# Patient Record
Sex: FEMALE | Race: WHITE
[De-identification: names, ages, dates, MRNs, and addresses within clinical notes are randomized per-mention and may not be internally consistent; named-entity substitution may affect disease eponyms.]

---

## 2017-09-18 ENCOUNTER — HOSPITAL ENCOUNTER (OUTPATIENT)
Dept: HOSPITAL 47 - LABPAT | Age: 22
Discharge: HOME | End: 2017-09-18
Payer: COMMERCIAL

## 2017-09-18 DIAGNOSIS — M23.91: ICD-10-CM

## 2017-09-18 DIAGNOSIS — Z01.812: Primary | ICD-10-CM

## 2017-09-18 LAB
ANION GAP SERPL CALC-SCNC: 11 MMOL/L
BASOPHILS # BLD AUTO: 0.1 K/UL (ref 0–0.2)
BASOPHILS NFR BLD AUTO: 1 %
CH: 30.5
CHCM: 33.7
CHLORIDE SERPL-SCNC: 105 MMOL/L (ref 98–107)
CO2 SERPL-SCNC: 24 MMOL/L (ref 22–30)
EOSINOPHIL # BLD AUTO: 0.2 K/UL (ref 0–0.7)
EOSINOPHIL NFR BLD AUTO: 2 %
ERYTHROCYTE [DISTWIDTH] IN BLOOD BY AUTOMATED COUNT: 5.02 M/UL (ref 3.8–5.4)
ERYTHROCYTE [DISTWIDTH] IN BLOOD: 13.4 % (ref 11.5–15.5)
HCT VFR BLD AUTO: 45.6 % (ref 34–46)
HDW: 2.06
HGB BLD-MCNC: 14.7 GM/DL (ref 11.4–16)
LUC NFR BLD AUTO: 1 %
LYMPHOCYTES # SPEC AUTO: 2 K/UL (ref 1–4.8)
LYMPHOCYTES NFR SPEC AUTO: 21 %
MCH RBC QN AUTO: 29.2 PG (ref 25–35)
MCHC RBC AUTO-ENTMCNC: 32.2 G/DL (ref 31–37)
MCV RBC AUTO: 90.8 FL (ref 80–100)
MONOCYTES # BLD AUTO: 0.5 K/UL (ref 0–1)
MONOCYTES NFR BLD AUTO: 6 %
NEUTROPHILS # BLD AUTO: 6.5 K/UL (ref 1.3–7.7)
NEUTROPHILS NFR BLD AUTO: 69 %
POTASSIUM SERPL-SCNC: 5 MMOL/L (ref 3.5–5.1)
SODIUM SERPL-SCNC: 140 MMOL/L (ref 137–145)
WBC # BLD AUTO: 0.13 10*3/UL
WBC # BLD AUTO: 9.3 K/UL (ref 3.8–10.6)
WBC (PEROX): 9.06

## 2017-09-18 PROCEDURE — 85025 COMPLETE CBC W/AUTO DIFF WBC: CPT

## 2017-09-18 PROCEDURE — 80051 ELECTROLYTE PANEL: CPT

## 2017-09-21 NOTE — HP
HISTORY AND PHYSICAL



CHIEF COMPLAINT:

Right knee pain.



HISTORY OF PRESENT ILLNESS:

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

pain after an injury on 08/18/2017.  She twisted her knee playing Walleyball.  She

notes giving way when she walks.  She has tried therapy in addition to medications, an

injection, and bracing.  She denies previous problems.



PAST MEDICAL HISTORY:

Negative.



CURRENT MEDICATIONS:

None.



ALLERGIES:

She has ALLERGIES TO AMOXICILLIN.



FAMILY HISTORY:

Significant for cancer.



SOCIAL HISTORY:

Negative for current tobacco or alcohol use.



REVIEW OF SYSTEMS:

Sixteen point review of systems otherwise reviewed and is noncontributory.



PHYSICAL EXAMINATION:

On examination, the patient is approximately 5 foot 5, 185 pounds of endomorphic

habitus.  HEENT exam is nonfocal.  NECK:  Supple.  She has painless passive motion of

her right hip.  Straight leg raise is negative.  Active motion right knee -4 to 135

degrees of flexion.  She has a mild effusion.  She is tender about the medial

retinaculum and medial joint line.  Collaterals stable, Lachman's negative, Duane's

elicits medial pain.  Her distal neurovascular appears intact in the right lower

extremity.



Previous MRI of the right knee shows grade 1 signal involving the posterior horn of the

medial meniscus.  There is a patellar plica noted laterally.  No definite ligamentous

injury is noted.



IMPRESSION:

1. Internal derangement, right knee with possible femoral trochlear chondral injury.

2. Right knee synovitis.



RECOMMENDATIONS:

I talked to the patient at length regarding her treatment options.  At this point, she

continues to have pain and mechanical symptoms despite adequate conservative measures.

After thorough discussion, she opts to proceed with surgery.  We will plan to proceed

with right knee arthroscopic evaluation with possible partial synovectomy in addition

to femoral trochlear chondroplasty.  Risks and benefits were discussed at length in

layman's terms.  We will likely perform that as an outpatient procedure.





MMODL / IJN: [id number] / Job#: [id number]

## 2017-09-22 ENCOUNTER — HOSPITAL ENCOUNTER (OUTPATIENT)
Dept: HOSPITAL 47 - OR | Age: 22
Discharge: HOME | End: 2017-09-22
Payer: COMMERCIAL

## 2017-09-22 VITALS — HEART RATE: 90 BPM | SYSTOLIC BLOOD PRESSURE: 126 MMHG | DIASTOLIC BLOOD PRESSURE: 79 MMHG

## 2017-09-22 VITALS — TEMPERATURE: 98 F

## 2017-09-22 VITALS — BODY MASS INDEX: 31.6 KG/M2

## 2017-09-22 VITALS — RESPIRATION RATE: 16 BRPM

## 2017-09-22 DIAGNOSIS — M67.51: ICD-10-CM

## 2017-09-22 DIAGNOSIS — Z88.0: ICD-10-CM
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
## 2017-09-22 DIAGNOSIS — Z79.1: ICD-10-CM

## 2017-09-22 DIAGNOSIS — S83.411A: Primary | ICD-10-CM

## 2017-09-22 PROCEDURE — 29875 ARTHRS KNEE SURG SYNVCT LMTD: CPT

## 2017-09-22 PROCEDURE — 81025 URINE PREGNANCY TEST: CPT

## 2017-09-22 RX ADMIN — HYDROMORPHONE HYDROCHLORIDE PRN MG: 1 INJECTION, SOLUTION INTRAMUSCULAR; INTRAVENOUS; SUBCUTANEOUS at 10:31

## 2017-09-22 RX ADMIN — ONDANSETRON ONE MG: 2 INJECTION INTRAMUSCULAR; INTRAVENOUS at 08:42

## 2017-09-22 RX ADMIN — ONDANSETRON ONE MG: 2 INJECTION INTRAMUSCULAR; INTRAVENOUS at 12:08

## 2017-09-22 RX ADMIN — HYDROMORPHONE HYDROCHLORIDE PRN MG: 1 INJECTION, SOLUTION INTRAMUSCULAR; INTRAVENOUS; SUBCUTANEOUS at 10:58

## 2017-09-22 RX ADMIN — HYDROMORPHONE HYDROCHLORIDE PRN MG: 1 INJECTION, SOLUTION INTRAMUSCULAR; INTRAVENOUS; SUBCUTANEOUS at 10:36

## 2017-09-22 NOTE — P.OP
Date of Procedure: 09/22/17


Preoperative Diagnosis: 


Right knee internal derangement


Postoperative Diagnosis: 


Right knee synovitis/medial patellofemoral plica


Procedure(s) Performed: 


Right knee arthroscopic partial synovectomy of the medial and patellofemoral 

compartments, plica resection


Anesthesia: NERYA


Surgeon: Trip Henderson


Estimated Blood Loss (ml): 10


Pathology: none sent


Condition: stable


Disposition: PACU


Indications for Procedure: 


The patient's 22-year-old female presents with progressive right knee pain and 

mechanical symptoms after a previous twisting injury despite extensive 

conservative measures.  A discussion of the risks and benefits of continued 

conservative measures versus operative intervention was made with patient.  She 

opted to proceed with surgery.  Operative risks to include infection, 

neurovascular injury, development of blood clots, possible incomplete 

resolution of symptoms, possible worsening of symptoms and need for subsequent 

procedures was discussed.  Informed consent was obtained.


Operative Findings: 


As below


Description of Procedure: 


The patient was brought to the operating room, and after induction of general 

anesthesia examined the right knee.  Collaterals were stable, Lachman was 

negative, and posterior drawer was negative.  The right lower extremity was 

prepped and draped in normal fashion.  A superior lateral portal was made 

through a 3 mm skin incision superior and lateral to the patella.  This was 

used for outflow.  A lateral portal was made through a 5 mm vertical skin 

incision lateral to the patella tendon above the joint line.  Diagnostic 

arthroscopy was performed.  A medial portals made through a similar incision 

medial to the patella tendon above the joint line.  On inspection medial 

compartment, the medial meniscus was stable and intact.  No significant 

chondral injury was noted.  Marked synovitis involving anterior medial 

compartment was noted and was debrided with a motorized shaver.  On inspection 

the notch, there were synovial attachments the fat pad.  These were debrided 

with a motorized shaver.  The anterior cruciate ligament appeared to be intact.

  On inspection of the lateral compartment no significant meniscal or cartilage 

pathology was noted.  On inspection the patellofemoral articulation, there was 

mild chondral fibrillation however no loose chondral fragments.  A large medial 

patellofemoral plica was noted in appeared to impinge on the medial femoral 

condyle.  This was debrided with a motorized shaver.  The gutters were clear 

debris.  The knee was then thoroughly irrigated.  The portals were closed with 

Steri-Strips.  A sterile dressing was applied in addition to a compression 

stocking.  The patient was awoken from general anesthesia and transferred to 

the recovery room in good condition.  Blood loss was estimated at 10 mL.  No 

complications were incurred.

## 2019-10-27 ENCOUNTER — HOSPITAL ENCOUNTER (OUTPATIENT)
Dept: HOSPITAL 47 - RADMRIMAIN | Age: 24
Discharge: HOME | End: 2019-10-27
Attending: ORTHOPAEDIC SURGERY
Payer: COMMERCIAL

## 2019-10-27 DIAGNOSIS — M94.261: Primary | ICD-10-CM

## 2019-10-27 NOTE — MR
EXAMINATION TYPE: MR knee RT wo con

 

DATE OF EXAM: 10/27/2019 7:54 AM

 

COMPARISON: NONE

 

HISTORY: Right knee pain

 

TECHNIQUE: Multiplanar, multiecho imaging of the right knee is performed without IV contrast.

 

FINDINGS: There is only a small amount of joint fluid.

 

There is some grade I to II chondromalacia involving the weightbearing surface of the medial femoral 
condyle.

 

Both the medial and lateral menisci are unremarkable.

 

Both the anterior and posterior cruciate ligaments are intact.

 

Both the medial and lateral collateral ligament complexes are intact. Iliotibial band inserts normall
y upon Gerdy's tubercle.

 

The popliteus muscle and tendon are unremarkable.

 

Both the patellar and quadriceps tendon are intact. There is no significant swelling in the Hoffa fat
 space.

 

Bony structures are unremarkable.

 

IMPRESSION:  

1. MINIMAL CHONDROMALACIA AS DESCRIBED.

2. NO SIGNIFICANT LIGAMENTOUS OR MENISCAL INJURY.

## 2020-05-08 ENCOUNTER — HOSPITAL ENCOUNTER (OUTPATIENT)
Dept: HOSPITAL 47 - LABWHC1 | Age: 25
End: 2020-05-08
Attending: INTERNAL MEDICINE
Payer: COMMERCIAL

## 2020-05-08 DIAGNOSIS — Z03.818: Primary | ICD-10-CM

## 2020-05-08 PROCEDURE — 87635 SARS-COV-2 COVID-19 AMP PRB: CPT
